# Patient Record
Sex: MALE | Race: WHITE | Employment: OTHER | URBAN - METROPOLITAN AREA
[De-identification: names, ages, dates, MRNs, and addresses within clinical notes are randomized per-mention and may not be internally consistent; named-entity substitution may affect disease eponyms.]

---

## 2021-05-07 ENCOUNTER — OFFICE VISIT (OUTPATIENT)
Dept: OTOLARYNGOLOGY | Facility: CLINIC | Age: 66
End: 2021-05-07
Payer: COMMERCIAL

## 2021-05-07 VITALS — HEIGHT: 68 IN | BODY MASS INDEX: 28.64 KG/M2 | WEIGHT: 189 LBS

## 2021-05-07 DIAGNOSIS — R09.81 NASAL CONGESTION: ICD-10-CM

## 2021-05-07 DIAGNOSIS — R04.0 NOSEBLEED: Primary | ICD-10-CM

## 2021-05-07 PROCEDURE — 99203 OFFICE O/P NEW LOW 30 MIN: CPT | Performed by: OTOLARYNGOLOGY

## 2021-05-07 PROCEDURE — 3008F BODY MASS INDEX DOCD: CPT | Performed by: OTOLARYNGOLOGY

## 2021-05-07 PROCEDURE — 30901 CONTROL OF NOSEBLEED: CPT | Performed by: OTOLARYNGOLOGY

## 2021-05-07 RX ORDER — VALSARTAN 160 MG/1
TABLET ORAL
COMMUNITY

## 2021-05-07 RX ORDER — LORATADINE 10 MG/1
10 TABLET ORAL DAILY
Qty: 30 TABLET | Refills: 3 | Status: SHIPPED | OUTPATIENT
Start: 2021-05-07

## 2021-05-07 RX ORDER — LOSARTAN POTASSIUM 50 MG/1
50 TABLET ORAL DAILY
COMMUNITY
Start: 2021-04-08

## 2021-05-07 RX ORDER — GABAPENTIN 100 MG/1
CAPSULE ORAL
COMMUNITY
Start: 2021-03-25

## 2021-05-07 RX ORDER — GLYCOPYRROLATE 1 MG/1
1 TABLET ORAL 3 TIMES DAILY
Qty: 90 TABLET | Refills: 1 | Status: SHIPPED | OUTPATIENT
Start: 2021-05-07 | End: 2021-06-01

## 2021-05-07 RX ORDER — CYCLOBENZAPRINE HCL 10 MG
10 TABLET ORAL
COMMUNITY

## 2021-05-07 RX ORDER — RANITIDINE 150 MG/1
150 TABLET ORAL DAILY
COMMUNITY

## 2021-05-07 RX ORDER — TICAGRELOR 90 MG/1
90 TABLET ORAL 2 TIMES DAILY
COMMUNITY
Start: 2021-04-22

## 2021-05-07 RX ORDER — HYDROCHLOROTHIAZIDE 25 MG/1
25 TABLET ORAL DAILY
COMMUNITY
Start: 2021-04-08

## 2021-05-07 RX ORDER — ATORVASTATIN CALCIUM 80 MG/1
80 TABLET, FILM COATED ORAL DAILY
COMMUNITY
Start: 2021-04-03

## 2021-05-07 RX ORDER — MONTELUKAST SODIUM 10 MG/1
10 TABLET ORAL NIGHTLY
Qty: 30 TABLET | Refills: 3 | Status: SHIPPED | OUTPATIENT
Start: 2021-05-07 | End: 2021-08-04

## 2021-05-07 NOTE — PROGRESS NOTES
Apolinar West is a 73year old male. Patient presents with:  Nose Problem: Patient Presents with Nosebleed right nostrl   Nose Problem: sinus issues, trouble breathing       HISTORY OF PRESENT ILLNESS  He presents with a history of chronic sinus issues.   He SURGICAL HISTORY      Heart stent    • OTHER SURGICAL HISTORY      Sinus surgery         REVIEW OF SYSTEMS    System Neg/Pos Details   Constitutional Negative Fatigue, fever and weight loss. ENMT Negative Drooling.    Eyes Negative Blurred vision and visi -Normal  Turbinates - Right: Normal, Left: Normal.  Hypertrophic anterior inferior vessels on the right septal mucosa   Procedures:  Control Epistaxis:  Pre-Procedure Care: Consent was obtained. Procedure/Risks were explained. Questions were answered.  Renetta Calzada drying for his taste. This note was prepared using Dydra voice recognition dictation software. As a result errors may occur. When identified these errors have been corrected.  While every attempt is made to correct errors during dictation di

## 2021-06-01 RX ORDER — GLYCOPYRROLATE 1 MG/1
1 TABLET ORAL 3 TIMES DAILY
Qty: 90 TABLET | Refills: 0 | Status: SHIPPED | OUTPATIENT
Start: 2021-06-01 | End: 2021-07-01

## 2021-06-04 ENCOUNTER — OFFICE VISIT (OUTPATIENT)
Dept: OTOLARYNGOLOGY | Facility: CLINIC | Age: 66
End: 2021-06-04
Payer: COMMERCIAL

## 2021-06-04 VITALS
WEIGHT: 189 LBS | TEMPERATURE: 97 F | BODY MASS INDEX: 28.64 KG/M2 | DIASTOLIC BLOOD PRESSURE: 77 MMHG | SYSTOLIC BLOOD PRESSURE: 124 MMHG | HEIGHT: 68 IN

## 2021-06-04 DIAGNOSIS — R04.0 NOSEBLEED: Primary | ICD-10-CM

## 2021-06-04 DIAGNOSIS — R07.0 THROAT DISCOMFORT: ICD-10-CM

## 2021-06-04 DIAGNOSIS — R09.81 NASAL CONGESTION: ICD-10-CM

## 2021-06-04 PROCEDURE — 3008F BODY MASS INDEX DOCD: CPT | Performed by: OTOLARYNGOLOGY

## 2021-06-04 PROCEDURE — 3078F DIAST BP <80 MM HG: CPT | Performed by: OTOLARYNGOLOGY

## 2021-06-04 PROCEDURE — 31575 DIAGNOSTIC LARYNGOSCOPY: CPT | Performed by: OTOLARYNGOLOGY

## 2021-06-04 PROCEDURE — 99213 OFFICE O/P EST LOW 20 MIN: CPT | Performed by: OTOLARYNGOLOGY

## 2021-06-04 PROCEDURE — 3074F SYST BP LT 130 MM HG: CPT | Performed by: OTOLARYNGOLOGY

## 2021-06-04 RX ORDER — LANSOPRAZOLE 30 MG/1
1 TABLET, ORALLY DISINTEGRATING, DELAYED RELEASE ORAL DAILY
Qty: 30 TABLET | Refills: 3 | Status: SHIPPED | OUTPATIENT
Start: 2021-06-04 | End: 2021-07-04

## 2021-06-04 NOTE — PROGRESS NOTES
Aylin Mckeon is a 73year old male. Patient presents with:   Follow - Up: Patient here for 1 month follow up regarding Nasal congestion, per pt feels medication is helping during the day, not able to breathe well at night time  Throat Problem: burning sensa file    Tobacco Use      Smoking status: Former Smoker      Smokeless tobacco: Never Used    Vaping Use      Vaping Use: Never used    Substance and Sexual Activity      Alcohol use: Not Currently      Drug use: Never      Family History   Problem Relation nerves II through XII grossly intact.    Head/Face Normal Facial features - Normal. Eyebrows - Normal. Skull - Normal.        Nasopharynx Normal External nose - Normal. Lips/teeth/gums - Normal. Tonsils - Normal. Oropharynx - Normal.   Ears Normal Inspectio Dispersible, Take 1 tablet by mouth daily. , Disp: 30 tablet, Rfl: 3  •  GLYCOPYRROLATE 1 MG Oral Tab, TAKE 1 TABLET (1 MG TOTAL) BY MOUTH 3 (THREE) TIMES DAILY. , Disp: 90 tablet, Rfl: 0  •  atorvastatin 80 MG Oral Tab, Take 80 mg by mouth daily. , Disp: , R every attempt is made to correct errors during dictation discrepancies may still exist    Parth Bullard MD    6/4/2021    9:46 AM

## 2021-07-01 RX ORDER — GLYCOPYRROLATE 1 MG/1
TABLET ORAL
Qty: 90 TABLET | Refills: 0 | Status: SHIPPED | OUTPATIENT
Start: 2021-07-01

## 2021-07-01 NOTE — TELEPHONE ENCOUNTER
LOV 6/4/21  This refill request is being sent to the provider for the following reason:  []Patient has not had an appointment within the past 12 months but has made an appointment on: ___  [x]Medication is not within protocol  []Patient did not complete fo

## 2021-07-16 ENCOUNTER — OFFICE VISIT (OUTPATIENT)
Dept: OTOLARYNGOLOGY | Facility: CLINIC | Age: 66
End: 2021-07-16
Payer: COMMERCIAL

## 2021-07-16 VITALS — BODY MASS INDEX: 28.64 KG/M2 | WEIGHT: 189 LBS | HEIGHT: 68 IN | TEMPERATURE: 97 F

## 2021-07-16 DIAGNOSIS — R04.0 NOSEBLEED: Primary | ICD-10-CM

## 2021-07-16 DIAGNOSIS — R07.0 THROAT DISCOMFORT: ICD-10-CM

## 2021-07-16 DIAGNOSIS — R09.81 NASAL CONGESTION: ICD-10-CM

## 2021-07-16 PROCEDURE — 3008F BODY MASS INDEX DOCD: CPT | Performed by: OTOLARYNGOLOGY

## 2021-07-16 PROCEDURE — 99213 OFFICE O/P EST LOW 20 MIN: CPT | Performed by: OTOLARYNGOLOGY

## 2021-07-16 NOTE — PROGRESS NOTES
Nathen Yi is a 73year old male.   Patient presents with:  Throat Problem: f/u throat discomfort, per pt improvement       HISTORY OF PRESENT ILLNESS  He presents with a history of chronic sinus issues. Delia Funes had a septoplasty and turbinate reduction back i Spouse name: Not on file      Number of children: Not on file      Years of education: Not on file      Highest education level: Not on file    Tobacco Use      Smoking status: Former Smoker      Smokeless tobacco: Never Used    Vaping Use      Vaping Use: Normal Memory - Normal. Cranial nerves - Cranial nerves II through XII grossly intact.    Head/Face Normal Facial features - Normal. Eyebrows - Normal. Skull - Normal.        Nasopharynx Normal External nose - Normal. Lips/teeth/gums - Normal. Tonsils - Nor 7/16/2021 ), Disp: , Rfl:   ASSESSMENT AND PLAN    1. Nosebleed    2. Nasal congestion    3.  Throat discomfort  Doing well currently and we discussed weaning the glycopyrrolate and continuing with Ponaris and even using the nasal spray for 1 about since hi

## 2021-08-04 RX ORDER — MONTELUKAST SODIUM 10 MG/1
TABLET ORAL
Qty: 90 TABLET | Refills: 0 | Status: SHIPPED | OUTPATIENT
Start: 2021-08-04 | End: 2021-10-29

## 2021-08-04 RX ORDER — LORATADINE 10 MG/1
TABLET ORAL
Qty: 90 TABLET | Refills: 1 | OUTPATIENT
Start: 2021-08-04

## 2021-10-29 RX ORDER — MONTELUKAST SODIUM 10 MG/1
TABLET ORAL
Qty: 90 TABLET | Refills: 2 | Status: SHIPPED | OUTPATIENT
Start: 2021-10-29

## 2022-06-02 ENCOUNTER — OFFICE VISIT (OUTPATIENT)
Dept: PHYSICAL MEDICINE AND REHAB | Facility: CLINIC | Age: 67
End: 2022-06-02
Payer: COMMERCIAL

## 2022-06-02 VITALS
BODY MASS INDEX: 30.82 KG/M2 | OXYGEN SATURATION: 95 % | WEIGHT: 185 LBS | HEART RATE: 51 BPM | DIASTOLIC BLOOD PRESSURE: 80 MMHG | SYSTOLIC BLOOD PRESSURE: 120 MMHG | HEIGHT: 65 IN

## 2022-06-02 DIAGNOSIS — M54.16 LEFT LUMBAR RADICULITIS: Primary | ICD-10-CM

## 2022-06-02 PROCEDURE — 99204 OFFICE O/P NEW MOD 45 MIN: CPT | Performed by: PHYSICAL MEDICINE & REHABILITATION

## 2022-06-02 PROCEDURE — 3079F DIAST BP 80-89 MM HG: CPT | Performed by: PHYSICAL MEDICINE & REHABILITATION

## 2022-06-02 PROCEDURE — 3074F SYST BP LT 130 MM HG: CPT | Performed by: PHYSICAL MEDICINE & REHABILITATION

## 2022-06-02 PROCEDURE — 3008F BODY MASS INDEX DOCD: CPT | Performed by: PHYSICAL MEDICINE & REHABILITATION

## 2022-06-02 RX ORDER — METHYLPREDNISOLONE 4 MG/1
TABLET ORAL
Qty: 1 EACH | Refills: 0 | Status: SHIPPED | OUTPATIENT
Start: 2022-06-02

## 2022-06-13 ENCOUNTER — TELEPHONE (OUTPATIENT)
Dept: NEUROLOGY | Facility: CLINIC | Age: 67
End: 2022-06-13

## 2022-06-29 ENCOUNTER — MED REC SCAN ONLY (OUTPATIENT)
Dept: PHYSICAL MEDICINE AND REHAB | Facility: CLINIC | Age: 67
End: 2022-06-29

## 2022-07-27 ENCOUNTER — MED REC SCAN ONLY (OUTPATIENT)
Dept: PHYSICAL MEDICINE AND REHAB | Facility: CLINIC | Age: 67
End: 2022-07-27

## (undated) NOTE — LETTER
[address], [facility]   Date:   6/2/2022     Name:   Halina Jackson    YOB: 1955   MRN:   [id number]       WHERE IS YOUR PAIN NOW? Meghana the areas on your body where you feel the described sensations. Use the appropriate symbol. Carolina Townsend the areas of radiation. Include all affected areas. Just to complete the picture, please draw in the face. ACHE:  ^ ^ ^   NUMBNESS:  0000   PINS & NEEDLES:  = = = =                              ^ ^ ^                       0000              = = = =                                    ^ ^ ^                       0000            = = = =      BURNING:  XXXX   STABBING: ////                  XXXX                ////                         XXXX          ////     Please meghana the line below indicating your degree of pain right now  with 0 being no pain 10 being the worst pain possible.                                          0             1             2              3             4              5              6              7             8             9             10         Patient Signature: